# Patient Record
Sex: FEMALE | Race: WHITE
[De-identification: names, ages, dates, MRNs, and addresses within clinical notes are randomized per-mention and may not be internally consistent; named-entity substitution may affect disease eponyms.]

---

## 2017-05-16 ENCOUNTER — HOSPITAL ENCOUNTER (OUTPATIENT)
Dept: HOSPITAL 80 - FIMAGING | Age: 67
End: 2017-05-16
Payer: COMMERCIAL

## 2017-05-16 DIAGNOSIS — Z12.31: Primary | ICD-10-CM

## 2017-05-16 DIAGNOSIS — Z85.3: ICD-10-CM

## 2017-05-16 PROCEDURE — G0202 SCR MAMMO BI INCL CAD: HCPCS

## 2018-03-28 ENCOUNTER — HOSPITAL ENCOUNTER (OUTPATIENT)
Dept: HOSPITAL 80 - F3E | Age: 68
Setting detail: OBSERVATION
LOS: 1 days | Discharge: HOME | End: 2018-03-29
Attending: INTERNAL MEDICINE | Admitting: INTERNAL MEDICINE
Payer: COMMERCIAL

## 2018-03-28 DIAGNOSIS — Z85.3: ICD-10-CM

## 2018-03-28 DIAGNOSIS — K35.80: Primary | ICD-10-CM

## 2018-03-28 DIAGNOSIS — Z88.1: ICD-10-CM

## 2018-03-28 DIAGNOSIS — T36.8X5A: ICD-10-CM

## 2018-03-28 DIAGNOSIS — Z99.81: ICD-10-CM

## 2018-03-28 DIAGNOSIS — G47.33: ICD-10-CM

## 2018-03-28 DIAGNOSIS — Z87.820: ICD-10-CM

## 2018-03-28 DIAGNOSIS — I25.10: ICD-10-CM

## 2018-03-28 DIAGNOSIS — M13.0: ICD-10-CM

## 2018-03-28 DIAGNOSIS — I10: ICD-10-CM

## 2018-03-28 DIAGNOSIS — J45.909: ICD-10-CM

## 2018-03-28 PROCEDURE — G0378 HOSPITAL OBSERVATION PER HR: HCPCS

## 2018-03-28 PROCEDURE — 0DTJ4ZZ RESECTION OF APPENDIX, PERCUTANEOUS ENDOSCOPIC APPROACH: ICD-10-PCS | Performed by: SURGERY

## 2018-03-28 PROCEDURE — 88304 TISSUE EXAM BY PATHOLOGIST: CPT

## 2018-03-28 PROCEDURE — 44970 LAPAROSCOPY APPENDECTOMY: CPT

## 2018-03-28 RX ADMIN — DOCUSATE SODIUM AND SENNOSIDES SCH TAB: 50; 8.6 TABLET ORAL at 22:09

## 2018-03-28 RX ADMIN — BUDESONIDE AND FORMOTEROL FUMARATE DIHYDRATE SCH: 160; 4.5 AEROSOL RESPIRATORY (INHALATION) at 21:15

## 2018-03-28 RX ADMIN — IRBESARTAN SCH MG: 150 TABLET ORAL at 22:17

## 2018-03-28 NOTE — POSTOPPROG
Post Op Note


Date of Operation: 03/28/18


Surgeon: Pro Martin


Anesthesiologist: NIDHI


Anesthesia: GET(General Endotracheal)


Pre-op Diagnosis: ACUTE APPENDICITIS


Post-op Diagnosis: SAME


Indication: SAME


Procedure: LAP APPY


Findings: MARKEDLY INFLAMED BUT NOT PERFORATED APPENDICITIS


Inf/Abcess present in the surg proc area at time of surgery?: Yes


Depth: Organ Space


EBL: Minimal


Complications: 





NONE


Specimen(s): 





APPENDIX
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
## 2018-03-28 NOTE — PDGENHP
History & Physical


Chief Complaint: APPENDICITIS


History of Present Illness: 68 FEMALE WITH ACUTE APPE SEEN ON CT SCAN. WBC 17K. 

PAIN X 2DAYS WITH NAUSEA BUT NO DIARHEA OR EMESIS.  RISKS AND OPTIONS FULLY 

DISCUSSED AND SHE IS ADMITTED FOR LAP APPE


Pertinent Past, Social, Family History: PHX COPD/ BREAST BX.  FAM HX 

NONCONTRIBUTORY.  ROS - 10 PT REVIEW/ SHE DOES NOT SMOKE.  ALL: MULTIPLE.  MEDS 

MELOXICAM


Relevant Physical Exam: 68 FEMALE IN NO ACUTE DISTRESS, AFEBRILE.  HEENT: 

NONICTERIC BUT SOME SCLERAL INJECTION, NO ADENOPATHY, NO THYROMEGALY, NO 

BRUITS.  COR RR.  CHEST SOME RHONCHI BUT NO WHEEZING, SYMMETRIC.  ABD: SOFT, 

TENDER RLQ WITH GUARDING, NO MASSES OR HERNIAS.  EXTREM: FULL PULSES AND FULL 

ROM.  NEURO SYMMETRICAND PHYSIOLOGIC.  PYSCH: ALERT, COOPERATIVE AND ORIENTED


Cardiorespiratory Assessment: IMP: ACUTE APPE.  PLAN LAP APPE/  RISKS AND 

OPTIONS FULLY DISCUSSED
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
## 2018-03-28 NOTE — SOAPPROG
SOAP Progress Note


Assessment/Plan: 


Assessment:


























Plan:





03/28/18 18:36


Acute appendicitis: have contacted Dr. Martin who will take pt to surgery 

tonight. She is allergic to multiple antibx though tolerates azithromycin, 

biaxin, levaquin, cipro. Begin IV cipro, flagyl.


Asthma/COPD: has chronic cough productive of sputum. O2 sat 89% in office this 

afternoon, which is not uncommon for her. Is on spiriva and symbicort daily, 

and proair prn. Will monitor closely, but not give additional steroid at this 

time unless needed. For now, will manage with neb treatments as needed. Uses 

nocturnal oxygen at 2L/min


CAD: mild, asymptomatic disease. Agatston score 48.2 on last heart scan in 5/16

, with 1% annualized rate of progression.


Hypertension: BP elevated, likely due to pain. Typically better controlled. 

Will continue irbesartan.


Inflammatory oligoarthritis: on hydroxychloroquine, meloxicam. Followed by Dr. Meyer.


DVT prophylaxis: lovenox, heparin contraindicated, so will us SCDs.


Dispo: anticipate greater than 2 MN, due to appendicitis, need for surgery.


03/28/18 18:47





03/28/18 18:49





Subjective: 


67 yo woman with hx of asthma, inflammatory arthritis, breast cancer, mild CAD, 

and hypertension was seen in the office today c/o RLQ pain. Hadn't been feeling 

well for the last 2-3 days, then woke around 3am this morning with significant 

RLQ abdominal pain. No fever, but has had some chills. Mild nausea, no vomiting

, diarrhea, constipation. No appetite. Last BM this morning. By the time she 

was seen this afternoon, her abdominal pain was more diffuse. CBC done in the 

office showed WBC of 17,000 with L shift. CMP was normal. CT of abdomen and 

pelvis with contrast showed acute appendicitis, so pt admitted directly to [facility]. 

She will bring copy of her CT scan on CD with her.





Objective: 





 Vital Signs











Temp Pulse Resp BP Pulse Ox


 


 37.2 C   112 H  18   182/108 H  93 


 


 03/28/18 17:49  03/28/18 17:49  03/28/18 17:49  03/28/18 17:49  03/28/18 17:49








General: uncomfortable appearing, NAD, well-developed, well-nourished, alert


HEENT: NC/AT. PERRL, EOMI. R subconjunctival hemorrhage. Auditory canals clear, 

bilateral hearing aids. O/p without erythema


Neck: supple, no masses, adenopathy


Lungs: clear bilaterally


Cardiovascular: Regular rhythm, mild tachycardia, no ectopy


Abdomen: no bowel sounds, mildly distended but generally soft. Mild percussion 

tenderness RLQ. Diffusely tender with palpation, worse in RLQ. No rebound 

tenderness.


Extremities: no clubbing, cyanosis, edema


Skin: no rash


Neurologic: alert, moving all extremities, no confusion








ICD10 Worksheet


Patient Problems: 


 Problems











Problem Status Onset


 


Appendicitis Acute

## 2018-03-29 VITALS — DIASTOLIC BLOOD PRESSURE: 92 MMHG | SYSTOLIC BLOOD PRESSURE: 154 MMHG

## 2018-03-29 LAB — PLATELET # BLD: 280 10^3/UL (ref 150–400)

## 2018-03-29 RX ADMIN — KETOROLAC TROMETHAMINE SCH: 15 INJECTION, SOLUTION INTRAMUSCULAR; INTRAVENOUS at 18:13

## 2018-03-29 RX ADMIN — KETOROLAC TROMETHAMINE SCH MG: 15 INJECTION, SOLUTION INTRAMUSCULAR; INTRAVENOUS at 00:52

## 2018-03-29 RX ADMIN — DOCUSATE SODIUM AND SENNOSIDES SCH TAB: 50; 8.6 TABLET ORAL at 09:02

## 2018-03-29 RX ADMIN — KETOROLAC TROMETHAMINE SCH MG: 15 INJECTION, SOLUTION INTRAMUSCULAR; INTRAVENOUS at 12:29

## 2018-03-29 RX ADMIN — IRBESARTAN SCH MG: 150 TABLET ORAL at 12:09

## 2018-03-29 RX ADMIN — KETOROLAC TROMETHAMINE SCH MG: 15 INJECTION, SOLUTION INTRAMUSCULAR; INTRAVENOUS at 05:15

## 2018-03-29 RX ADMIN — BUDESONIDE AND FORMOTEROL FUMARATE DIHYDRATE SCH: 160; 4.5 AEROSOL RESPIRATORY (INHALATION) at 09:45

## 2018-03-29 NOTE — SOAPPROG
SOAP Progress Note


Assessment/Plan: 


Assessment:





69 y/o F s/p lap appy 3/28





S:  Doing well over all.  Not passing flatus or BMs yet.  Pain well controlled 

on oral pain meds.





O: Alert


Afebrile


Cardiac: RRR


Lungs: CTA bilaterally


Abdomen: soft, moderately distended, incisions cdi, hypoactive bowel sounds





Plan:  Most likely discharge tomorrow.  





03/29/18 17:58





Objective: 





 Vital Signs











Temp Pulse Resp BP Pulse Ox


 


 36.6 C   108 H  16   154/92 H  97 


 


 03/29/18 15:54  03/29/18 15:54  03/29/18 15:54  03/29/18 15:54  03/29/18 15:54








 Laboratory Results





 03/29/18 04:45 





 











 03/28/18 03/29/18 03/30/18





 05:59 05:59 05:59


 


Intake Total  2155 


 


Output Total  20 


 


Balance  2135 














ICD10 Worksheet


Patient Problems: 


 Problems











Problem Status Onset


 


Appendicitis Acute

## 2018-03-29 NOTE — SOAPPROG
SOAP Progress Note


Assessment/Plan: 


Assessment:


























Plan:





03/29/18 08:37


acute appendicitis.  doing well this am.  It looks like she has had invanz x1 

pre-op and flagyl.  Message to Dr Martin regarding need for antibiotics at this 

time.  Breakfast ordered.  Anticipate d/c later today.  


Subjective: 





Millie states she feels much better.  No SOB, minimal stable cough.  No CP/

palpitations.  She has ordered breakfast.  No flatus yet.  Minimal abdominal 

pain from incisions.


Objective: 





 Vital Signs











Temp Pulse Resp BP Pulse Ox


 


 36.9 C   77   16   128/70 H  93 


 


 03/29/18 07:41  03/29/18 07:41  03/29/18 07:41  03/29/18 07:41  03/29/18 07:41








 Laboratory Results





 03/29/18 04:45 





 











 03/28/18 03/29/18 03/30/18





 05:59 05:59 05:59


 


Intake Total  2155 


 


Output Total  20 


 


Balance  2135 








Gen: Bright, NAD


Lungs: stable diminished BS


Heart: RRR  BP improved


Abd + bs soft, laparoscopic incisions look great.


LE's 2/4 PT's no edema





WBC 17-->11











ICD10 Worksheet


Patient Problems: 


 Problems











Problem Status Onset


 


Appendicitis Acute  














- ICD10 Problem Qualifiers


(1) Appendicitis

## 2018-03-29 NOTE — ASMTCMCOM
CM Note

 

CM Note                       

Notes:

Spoke w/RN, anticipate pt will dc home w/support of familly when medically stable. No therapies 

ordered, CM available for any changes.



DC Plan: Independent

 

Date Signed:  03/29/2018 11:06 AM

Electronically Signed By:Gloria Burkett RN

## 2018-03-29 NOTE — POSTANESTH
Post Anesthetic Evaluation


Cardiovascular Status: Similar to Pre-Op Cond


Respiratory Status: Similar to Pre-op Cond.


Level of Consciousness/Mental Status: Can Participate in Eval


Pain Control: Adequate, Prn Tx Ordered


Nausea/Vomiting Control: Adequate, Prn Tx Ordered


Complications Possibly Related to Anesthesia: None Noted

## 2018-03-29 NOTE — SOAPPROG
SOAP Progress Note


Assessment/Plan: 


Assessment:


























Plan:





03/29/18 08:37


acute appendicitis.  doing well this am.  It looks like she has had invanz x1 

pre-op and flagyl.  Message to Dr Martin regarding need for antibiotics at this 

time.  Breakfast ordered.  Anticipate d/c later today.  


03/29/18 18:02


patient with likely allergy to flagyl from earlier today.  She is feeling 

better after benadryl.  She is mildly tachy, will give a single dose of 

metoprolol now and then plan on d/c home.  She otherwise is feeling well.  She 

ate dinner it is sitting well. 


Objective: 





 Vital Signs











Temp Pulse Resp BP Pulse Ox


 


 36.6 C   108 H  16   154/92 H  97 


 


 03/29/18 15:54  03/29/18 15:54  03/29/18 15:54  03/29/18 15:54  03/29/18 15:54








 Laboratory Results





 03/29/18 04:45 





 











 03/28/18 03/29/18 03/30/18





 05:59 05:59 05:59


 


Intake Total  2155 


 


Output Total  20 


 


Balance  2135 














ICD10 Worksheet


Patient Problems: 


 Problems











Problem Status Onset


 


Appendicitis Acute  














- ICD10 Problem Qualifiers


(1) Appendicitis

## 2018-03-29 NOTE — GDS
[f rep st]



                                                             DISCHARGE SUMMARY





PURPOSE FOR ADMISSION:  Acute appendicitis.



DISCHARGE DIAGNOSIS:  Acute appendicitis.



HOSPITAL COURSE:  Patient was admitted due to increasing right lower quadrant pain.  She had a CT per
formed at our office, which identified appendicitis.  She had surgical consultation with Dr. Pro Martin.  Surgery was performed yesterday evening.  Her appendix was quite inflamed but not perforated.
  She received a dose of Invanz prior to surgery and a dose of Flagyl.  She had a dose of Flagyl midd
ay today and probably had some sense of an allergic reaction to it with some flushing, tachycardia, a
nd nausea.  This was treated with Benadryl with good success.  She had a dose of Invanz later today, 
which she seems to have tolerated well.  She has had some elevation of her blood pressure and heart r
ate.  A dose of metoprolol will be given today before she goes home.  She will continue on oxygen sup
port with portable oxygen to get from hospital to home, and then she has a concentrator at home.  She
 will be followed closely.  Anticipate further blood work and serial exams to make sure she is healin
g appropriately.





Job #:  [id number]

## 2018-04-03 NOTE — GOP
[f rep st]



                                                                OPERATIVE REPORT





DATE OF OPERATION:  03/28/2018



SURGEON:  Pro Martin MD



ASSISTANT:  No assistant.



ANESTHESIOLOGIST:  Dr. Madrigal.



PREOPERATIVE DIAGNOSIS:  Acute appendicitis.



POSTOPERATIVE DIAGNOSIS:  

Acute appendicitis.



PROCEDURE PERFORMED:  Laparoscopic appendectomy.



FINDINGS:  Patient was found to have a markedly inflamed, but not perforated, appendicitis in a retro
cecal position.





ESTIMATED BLOOD LOSS:  Negligible.



DESCRIPTION OF PROCEDURE:  Patient taken to the operating room where she received satisfactory genera
l endotracheal anesthesia by Dr. Madrigal.  Placed in the supine position.  Prepped and draped in the u
sual sterile fashion.  A periumbilical incision was made.  A Veress needle inserted.  Pneumoperitoneu
m was established.  Trocar was introduced.  Laparoscope introduced.  Good visualization was obtained.
  Two other trocars were placed in the midline under direct vision.  The cecum was rotated medially. 
 The appendix was identified.  It was freed up from the retroperitoneal place by dividing the lateral
 peritoneal reflection and elevating the appendix up.  The mesoappendix was carefully dissected free 
with the Harmonic Scalpel with care to avoid injury to the wall of the cecum.  The appendix was quite
 thickened and inflamed and had tucked underneath the cecum.  The rest of this was skeletonized back 
to the colonic juncture where it was divided with an Endo-ASHLEY stapler, placed in a specimen bag and e
xtracted through the upper midline port site.  Wound was irrigated.  Hemostasis was assured.  Trocars
 removed under direct vision.  Trocar sites were closed with 0 Vicryl for the fascia, 4-0 Monocryl fo
r the skin.  All layers infiltrated with 0.5% Marcaine.



COMPLICATIONS:  None.





Job #:  [id number]

## 2019-06-11 ENCOUNTER — HOSPITAL ENCOUNTER (OUTPATIENT)
Dept: HOSPITAL 80 - FIMAGING | Age: 69
End: 2019-06-11
Payer: COMMERCIAL